# Patient Record
Sex: FEMALE | Race: WHITE | NOT HISPANIC OR LATINO | Employment: FULL TIME | URBAN - METROPOLITAN AREA
[De-identification: names, ages, dates, MRNs, and addresses within clinical notes are randomized per-mention and may not be internally consistent; named-entity substitution may affect disease eponyms.]

---

## 2017-02-10 ENCOUNTER — RESULTS ENCOUNTER (OUTPATIENT)
Dept: INTERNAL MEDICINE | Facility: CLINIC | Age: 27
End: 2017-02-10

## 2017-02-10 DIAGNOSIS — Z00.00 HEALTH CARE MAINTENANCE: ICD-10-CM

## 2017-07-10 DIAGNOSIS — Z30.011 BCP (BIRTH CONTROL PILLS) INITIATION: ICD-10-CM

## 2017-10-16 DIAGNOSIS — Z30.011 BCP (BIRTH CONTROL PILLS) INITIATION: ICD-10-CM

## 2017-11-01 ENCOUNTER — PROCEDURE VISIT (OUTPATIENT)
Dept: INTERNAL MEDICINE | Facility: CLINIC | Age: 27
End: 2017-11-01

## 2017-11-01 VITALS
TEMPERATURE: 98.7 F | SYSTOLIC BLOOD PRESSURE: 124 MMHG | HEIGHT: 63 IN | HEART RATE: 110 BPM | BODY MASS INDEX: 28.17 KG/M2 | OXYGEN SATURATION: 100 % | RESPIRATION RATE: 16 BRPM | DIASTOLIC BLOOD PRESSURE: 90 MMHG | WEIGHT: 159 LBS

## 2017-11-01 DIAGNOSIS — Z30.011 BCP (BIRTH CONTROL PILLS) INITIATION: ICD-10-CM

## 2017-11-01 DIAGNOSIS — Z86.19 HISTORY OF HPV INFECTION: ICD-10-CM

## 2017-11-01 DIAGNOSIS — Z00.00 ROUTINE ADULT HEALTH MAINTENANCE: ICD-10-CM

## 2017-11-01 DIAGNOSIS — Z12.4 PAP SMEAR FOR CERVICAL CANCER SCREENING: Primary | ICD-10-CM

## 2017-11-01 DIAGNOSIS — Z72.51 HIGH RISK SEXUAL BEHAVIOR: ICD-10-CM

## 2017-11-01 LAB
BILIRUB BLD-MCNC: NEGATIVE MG/DL
CLARITY, POC: ABNORMAL
COLOR UR: YELLOW
GLUCOSE UR STRIP-MCNC: NEGATIVE MG/DL
KETONES UR QL: NEGATIVE
LEUKOCYTE EST, POC: NEGATIVE
NITRITE UR-MCNC: NEGATIVE MG/ML
PH UR: 7.5 [PH] (ref 5–8)
PROT UR STRIP-MCNC: ABNORMAL MG/DL
RBC # UR STRIP: NEGATIVE /UL
SP GR UR: 1.01 (ref 1–1.03)
UROBILINOGEN UR QL: NORMAL

## 2017-11-01 PROCEDURE — 81003 URINALYSIS AUTO W/O SCOPE: CPT | Performed by: NURSE PRACTITIONER

## 2017-11-01 RX ORDER — DROSPIRENONE AND ETHINYL ESTRADIOL 0.02-3(28)
1 KIT ORAL DAILY
Qty: 84 TABLET | Refills: 0 | Status: SHIPPED | OUTPATIENT
Start: 2017-11-01 | End: 2018-03-02 | Stop reason: SDUPTHER

## 2017-11-01 NOTE — PROGRESS NOTES
Gynecologic Exam (off ocp's x 1 mo (no refill) states not sexually active)        Joanne Bello is being seen for a Complete physical exam. Her last physical was 8-. Colonoscopy was  completed never due to age. Last labs reviewed from 2-7-2017. She exercises 3 times a week at [employer]. She does not use tobacco. She drinks 2 alcoholic drinks per week.     Reproductive Health: Her Last Pap smear was 8- Last menstrual period was 3 weeks ago. DEXA scan complete never. Last Mammogram was never due to age.    Gynecologic Exam   The patient's pertinent negatives include no pelvic pain. Pertinent negatives include no back pain or urgency.        The following portions of the patient's history were reviewed and updated as appropriate: allergies, current medications, past family history, past medical history, past social history, past surgical history and problem list.    Review of Systems   Constitutional: Negative.    HENT: Negative.    Eyes: Negative.    Respiratory: Negative.    Cardiovascular: Negative.  Negative for chest pain, palpitations and leg swelling.   Endocrine: Negative.    Genitourinary: Negative.  Negative for decreased urine volume, difficulty urinating, dyspareunia, menstrual problem, pelvic pain and urgency.   Musculoskeletal: Negative.  Negative for arthralgias and back pain.   Skin: Negative.    Allergic/Immunologic: Negative.    Neurological: Negative.    Hematological: Negative.    Psychiatric/Behavioral: Negative.        Objective       General Appearance:    Alert, cooperative, no distress, appears stated age   Head:    Normocephalic, without obvious abnormality, atraumatic   Eyes:    PERRL, conjunctiva/corneas clear, EOM's intact, fundi     benign, both eyes   Ears:    Normal TM's and external ear canals, both ears   Nose:   Nares normal, septum midline, mucosa normal, no drainage     or sinus tenderness   Throat:   Lips, mucosa, and tongue normal; teeth and gums normal    Neck:   Supple, symmetrical, trachea midline, no adenopathy;     thyroid:  no enlargement/tenderness/nodules; no carotid    bruit or JVD   Back:     Symmetric, no curvature, ROM normal, no CVA tenderness   Lungs:     Clear to auscultation bilaterally, respirations unlabored   Chest Wall:    No tenderness or deformity    Heart:    Regular rate and rhythm, S1 and S2 normal, no murmur, rub    or gallop   Breast Exam:    No tenderness, masses, or nipple abnormality   Abdomen:     Soft, non-tender, bowel sounds active all four quadrants,     no masses, no organomegaly   Genitalia:    Normal female without lesion, discharge or tenderness   Rectal:    Normal tone, no masses or tenderness   Extremities:   Extremities normal, atraumatic, no cyanosis or edema   Pulses:   2+ and symmetric all extremities   Skin:   Skin color, texture, turgor normal, no rashes or lesions   Lymph nodes:   Cervical, supraclavicular, and axillary nodes normal   Neurologic:   CNII-XII intact, normal strength, sensation and reflexes     throughout             Assessment/Plan   Joanne was seen today for gynecologic exam.    Diagnoses and all orders for this visit:    Pap smear for cervical cancer screening  -     Pap IG, Ct-Ng TV Rfx HPV ASCU - ThinPrep Vial, Cervix    BCP (birth control pills) initiation  -     drospirenone-ethinyl estradiol (GIANVI) 3-0.02 MG per tablet; Take 1 tablet by mouth Daily.    Routine adult health maintenance  -     POCT urinalysis dipstick, automated    History of HPV infection  -     Pap IG, Ct-Ng TV Rfx HPV ASCU - ThinPrep Vial, Cervix    High risk sexual behavior  -     Pap IG, Ct-Ng TV Rfx HPV ASCU - ThinPrep Vial, Cervix      She will follow up at next scheduled appointment 1 year.

## 2017-11-03 LAB
C TRACH RRNA CVX QL NAA+PROBE: NEGATIVE
CONV .: NORMAL
CYTOLOGIST CVX/VAG CYTO: NORMAL
CYTOLOGY CVX/VAG DOC THIN PREP: NORMAL
DX ICD CODE: NORMAL
HIV 1 & 2 AB SER-IMP: NORMAL
N GONORRHOEA RRNA CVX QL NAA+PROBE: NEGATIVE
OTHER STN SPEC: NORMAL
PATH REPORT.FINAL DX SPEC: NORMAL
STAT OF ADQ CVX/VAG CYTO-IMP: NORMAL
T VAGINALIS RRNA SPEC QL NAA+PROBE: NEGATIVE

## 2017-11-08 ENCOUNTER — TELEPHONE (OUTPATIENT)
Dept: INTERNAL MEDICINE | Facility: CLINIC | Age: 27
End: 2017-11-08

## 2017-11-08 NOTE — TELEPHONE ENCOUNTER
Patient called with results      ----- Message from JASIEL Valera sent at 11/6/2017  8:21 AM EST -----  Pap smear is negative.

## 2018-03-02 ENCOUNTER — TELEPHONE (OUTPATIENT)
Dept: INTERNAL MEDICINE | Facility: CLINIC | Age: 28
End: 2018-03-02

## 2018-03-02 DIAGNOSIS — Z30.011 BCP (BIRTH CONTROL PILLS) INITIATION: ICD-10-CM

## 2018-03-02 RX ORDER — DROSPIRENONE AND ETHINYL ESTRADIOL 0.02-3(28)
1 KIT ORAL DAILY
Qty: 84 TABLET | Refills: 0 | Status: SHIPPED | OUTPATIENT
Start: 2018-03-02 | End: 2018-11-07

## 2018-03-02 NOTE — TELEPHONE ENCOUNTER
----- Message from Rachel Zhao sent at 3/2/2018 11:29 AM EST -----  Regarding: B/C   LOU    drospirenone-ethinyl estradiol (GIANVI) 3-0.02 MG per tablet   Birth control med  -  Take 1 tablet by mouth Daily      [facility] tells patient they sent over request but haven't heard back      SENT TO PHARMACY

## 2018-11-07 ENCOUNTER — PROCEDURE VISIT (OUTPATIENT)
Dept: INTERNAL MEDICINE | Facility: CLINIC | Age: 28
End: 2018-11-07

## 2018-11-07 VITALS
BODY MASS INDEX: 24.63 KG/M2 | WEIGHT: 139 LBS | DIASTOLIC BLOOD PRESSURE: 74 MMHG | HEART RATE: 82 BPM | HEIGHT: 63 IN | TEMPERATURE: 98.4 F | OXYGEN SATURATION: 100 % | RESPIRATION RATE: 16 BRPM | SYSTOLIC BLOOD PRESSURE: 110 MMHG

## 2018-11-07 DIAGNOSIS — Z01.419 ENCOUNTER FOR GYNECOLOGICAL EXAMINATION: ICD-10-CM

## 2018-11-07 DIAGNOSIS — L70.9 ADULT ACNE: ICD-10-CM

## 2018-11-07 DIAGNOSIS — Z00.00 HEALTH CARE MAINTENANCE: Primary | ICD-10-CM

## 2018-11-07 DIAGNOSIS — N64.4 BREAST TENDERNESS IN FEMALE: ICD-10-CM

## 2018-11-07 LAB
B-HCG UR QL: NEGATIVE
BILIRUB BLD-MCNC: NEGATIVE MG/DL
CLARITY, POC: CLEAR
COLOR UR: YELLOW
GLUCOSE UR STRIP-MCNC: NEGATIVE MG/DL
INTERNAL NEGATIVE CONTROL: NEGATIVE
INTERNAL POSITIVE CONTROL: POSITIVE
KETONES UR QL: NEGATIVE
LEUKOCYTE EST, POC: NEGATIVE
Lab: NORMAL
NITRITE UR-MCNC: NEGATIVE MG/ML
PH UR: 5.5 [PH] (ref 5–8)
PROT UR STRIP-MCNC: NEGATIVE MG/DL
RBC # UR STRIP: NEGATIVE /UL
SP GR UR: 1.02 (ref 1–1.03)
UROBILINOGEN UR QL: NORMAL

## 2018-11-07 PROCEDURE — 99395 PREV VISIT EST AGE 18-39: CPT | Performed by: NURSE PRACTITIONER

## 2018-11-07 PROCEDURE — 81003 URINALYSIS AUTO W/O SCOPE: CPT | Performed by: NURSE PRACTITIONER

## 2018-11-07 PROCEDURE — 81025 URINE PREGNANCY TEST: CPT | Performed by: NURSE PRACTITIONER

## 2018-11-07 RX ORDER — PNV NO.95/FERROUS FUM/FOLIC AC 28MG-0.8MG
1 TABLET ORAL DAILY
Qty: 30 TABLET | Refills: 0
Start: 2018-11-07

## 2018-11-07 RX ORDER — ADAPALENE AND BENZOYL PEROXIDE .1; 2.5 G/100G; G/100G
1 GEL TOPICAL NIGHTLY
Qty: 45 G | Refills: 1 | Status: SHIPPED | OUTPATIENT
Start: 2018-11-07 | End: 2018-11-12

## 2018-11-07 RX ORDER — VITAMIN E 268 MG
400 CAPSULE ORAL DAILY
Qty: 30 CAPSULE | Refills: 0
Start: 2018-11-07

## 2018-11-07 NOTE — PROGRESS NOTES
Annual Exam and Acne        Joanne Bello is being seen for a Complete physical exam. Her last physical was 11-1-2017.     Social: She is  . 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    Lifestyle: She does not use tobacco. She drinks 2 alcoholic drinks per week. She exercises 3 times a week at [employer].    Screening: Colonoscopy was completed never due to age. Last labs reviewed from .      Reproductive Health: Her Last Pap smear was 11-1-2017. Last menstrual period was 3 1/2 weeks ago. She has no living children. DEXA scan complete never due to age. Last Mammogram was never due to age    Dental exam is up to date. Eye exam was completed within the year.     She is complaining of some breast tenderness and acne around chin since stopping OCPs 6 months ago.     History of Present Illness     The following portions of the patient's history were reviewed and updated as appropriate: allergies, current medications, past family history, past medical history, past social history, past surgical history and problem list.    Review of Systems   Constitutional: Negative.    HENT: Negative.    Eyes: Negative.    Respiratory: Negative.  Negative for shortness of breath and stridor.    Cardiovascular: Negative.    Gastrointestinal: Negative.    Endocrine: Negative.    Genitourinary: Negative.  Negative for difficulty urinating, dyspareunia, frequency, genital sores, hematuria, menstrual problem and pelvic pain.   Musculoskeletal: Negative.    Skin: Positive for rash (acne).   Allergic/Immunologic: Negative.    Neurological: Negative.    Hematological: Negative.    Psychiatric/Behavioral: Negative.        Objective       General Appearance:    Alert, cooperative, no distress, appears stated age   Head:    Normocephalic, without obvious abnormality, atraumatic   Eyes:    PERRL, conjunctiva/corneas clear, EOM's intact, fundi     benign, both eyes   Ears:    Normal TM's and external ear canals,  both ears   Nose:   Nares normal, septum midline, mucosa normal, no drainage     or sinus tenderness   Throat:   Lips, mucosa, and tongue normal; teeth and gums normal   Neck:   Supple, symmetrical, trachea midline, no adenopathy;     thyroid:  no enlargement/tenderness/nodules; no carotid    bruit or JVD   Back:     Symmetric, no curvature, ROM normal, no CVA tenderness   Lungs:     Clear to auscultation bilaterally, respirations unlabored   Chest Wall:    No tenderness or deformity    Heart:    Regular rate and rhythm, S1 and S2 normal, no murmur, rub    or gallop   Breast Exam:    No tenderness, masses, or nipple abnormality   Abdomen:     Soft, non-tender, bowel sounds active all four quadrants,     no masses, no organomegaly   Genitalia:    Normal female without lesion, discharge or tenderness   Rectal:    Normal tone, no masses or tenderness   Extremities:   Extremities normal, atraumatic, no cyanosis or edema   Pulses:   2+ and symmetric all extremities   Skin:   Skin color, texture, turgor normal, no rashes or lesions   Lymph nodes:   Cervical, supraclavicular, and axillary nodes normal   Neurologic:   CNII-XII intact, normal strength, sensation and reflexes     throughout               Assessment/Plan   Joanne was seen today for annual exam and acne.    Diagnoses and all orders for this visit:    Health care maintenance  -     Prenatal Vit-Fe Fumarate-FA (PRENATAL VITAMIN AND MINERAL) 28-0.8 MG tablet; Take 1 tablet by mouth Daily.    Encounter for gynecological examination  -     Pap IG, Ct-Ng TV Rfx HPV All  -     POCT urinalysis dipstick, automated  -     POCT pregnancy, urine  -     Prenatal Vit-Fe Fumarate-FA (PRENATAL VITAMIN AND MINERAL) 28-0.8 MG tablet; Take 1 tablet by mouth Daily.    Adult acne  -     Adapalene-Benzoyl Peroxide 0.1-2.5 % gel; Apply 1 application topically to the appropriate area as directed Every Night.    Breast tenderness in female  -     vitamin E (vitamin E) 400 UNIT  capsule; Take 1 capsule by mouth Daily.      She wishes to remain off Birth control due to hopes of conceiving in the next 2 years.    She will follow up at next scheduled appointment in 1 year. Will consider a TDAP before pregnancy, but declines toda.

## 2018-11-07 NOTE — PATIENT INSTRUCTIONS
Breast Tenderness  Breast tenderness is a common problem for women of all ages. Breast tenderness may cause mild discomfort to severe pain. The pain usually comes and goes in association with your menstrual cycle, but it can be constant. Breast tenderness has many possible causes, including hormone changes and some medicines. Your health care provider may order tests, such as a mammogram or an ultrasound, to check for any unusual findings. Having breast tenderness usually does not mean that you have breast cancer.  Follow these instructions at home:  Sometimes, reassurance that you do not have breast cancer is all that is needed. In general, follow these home care instructions:  Managing pain and discomfort  · If directed, apply ice to the area:  ? Put ice in a plastic bag.  ? Place a towel between your skin and the bag.  ? Leave the ice on for 20 minutes, 2-3 times a day.  · Make sure you are wearing a supportive bra, especially during exercise. You may also want to wear a supportive bra while sleeping if your breasts are very tender.  Medicines  · Take over-the-counter and prescription medicines only as told by your health care provider. If the cause of your pain is infection, you may be prescribed an antibiotic medicine.  · If you were prescribed an antibiotic, take it as told by your health care provider. Do not stop taking the antibiotic even if you start to feel better.  General instructions  · Your health care provider may recommend that you reduce the amount of fat in your diet. You can do this by:  ? Limiting fried foods.  ? Cooking foods using methods, such as baking, boiling, grilling, and broiling.  · Decrease the amount of caffeine in your diet. You can do this by drinking more water and choosing caffeine-free options.  · Keep a log of the days and times when your breasts are most tender.  · Ask your health care provider how to do breast exams at home. This will help you notice if you have an unusual  growth or lump.  Contact a health care provider if:  · Any part of your breast is hard, red, and hot to the touch. This may be a sign of infection.  · You are not breastfeeding and you have fluid, especially blood or pus, coming out of your nipples.  · You have a fever.  · You have a new or painful lump in your breast that remains after your menstrual period ends.  · Your pain does not improve or it gets worse.  · Your pain is interfering with your daily activities.  This information is not intended to replace advice given to you by your health care provider. Make sure you discuss any questions you have with your health care provider.  Document Released: 11/30/2009 Document Revised: 09/15/2017 Document Reviewed: 09/15/2017  [employer] Interactive Patient Education © 2018 [employer]  Breast Self-Awareness  Breast self-awareness means being familiar with how your breasts look and feel. It involves checking your breasts regularly and reporting any changes to your health care provider.  Practicing breast self-awareness is important. A change in your breasts can be a sign of a serious medical problem. Being familiar with how your breasts look and feel allows you to find any problems early, when treatment is more likely to be successful. All women should practice breast self-awareness, including women who have had breast implants.  How to do a breast self-exam  One way to learn what is normal for your breasts and whether your breasts are changing is to do a breast self-exam. To do a breast self-exam:  Look for Changes    1. Remove all the clothing above your waist.  2.  front of a mirror in a room with good lighting.  3. Put your hands on your hips.  4. Push your hands firmly downward.  5. Compare your breasts in the mirror. Look for differences between them (asymmetry), such as:  ? Differences in shape.  ? Differences in size.  ? Puckers, dips, and bumps in one breast and not the other.  6. Look at each breast  for changes in your skin, such as:  ? Redness.  ? Scaly areas.  7. Look for changes in your nipples, such as:  ? Discharge.  ? Bleeding.  ? Dimpling.  ? Redness.  ? A change in position.  Feel for Changes    Carefully feel your breasts for lumps and changes. It is best to do this while lying on your back on the floor and again while sitting or standing in the shower or tub with soapy water on your skin. Feel each breast in the following way:  · Place the arm on the side of the breast you are examining above your head.  · Feel your breast with the other hand.  · Start in the nipple area and make ¾ inch (2 cm) overlapping circles to feel your breast. Use the pads of your three middle fingers to do this. Apply light pressure, then medium pressure, then firm pressure. The light pressure will allow you to feel the tissue closest to the skin. The medium pressure will allow you to feel the tissue that is a little deeper. The firm pressure will allow you to feel the tissue close to the ribs.  · Continue the overlapping circles, moving downward over the breast until you feel your ribs below your breast.  · Move one finger-width toward the center of the body. Continue to use the ¾ inch (2 cm) overlapping circles to feel your breast as you move slowly up toward your collarbone.  · Continue the up and down exam using all three pressures until you reach your armpit.    Write Down What You Find    Write down what is normal for each breast and any changes that you find. Keep a written record with breast changes or normal findings for each breast. By writing this information down, you do not need to depend only on memory for size, tenderness, or location. Write down where you are in your menstrual cycle, if you are still menstruating.  If you are having trouble noticing differences in your breasts, do not get discouraged. With time you will become more familiar with the variations in your breasts and more comfortable with the  exam.  How often should I examine my breasts?  Examine your breasts every month. If you are breastfeeding, the best time to examine your breasts is after a feeding or after using a breast pump. If you menstruate, the best time to examine your breasts is 5-7 days after your period is over. During your period, your breasts are lumpier, and it may be more difficult to notice changes.  When should I see my health care provider?  See your health care provider if you notice:  · A change in shape or size of your breasts or nipples.  · A change in the skin of your breast or nipples, such as a reddened or scaly area.  · Unusual discharge from your nipples.  · A lump or thick area that was not there before.  · Pain in your breasts.  · Anything that concerns you.    This information is not intended to replace advice given to you by your health care provider. Make sure you discuss any questions you have with your health care provider.  Document Released: 12/18/2006 Document Revised: 05/25/2017 Document Reviewed: 11/06/2016  [employer] Interactive Patient Education © 2018 [employer]

## 2018-11-12 PROBLEM — L70.9 ADULT ACNE: Status: ACTIVE | Noted: 2018-11-12

## 2018-11-12 RX ORDER — TRETINOIN 0.5 MG/G
CREAM TOPICAL NIGHTLY
Qty: 45 G | Refills: 1 | Status: SHIPPED | OUTPATIENT
Start: 2018-11-12